# Patient Record
Sex: FEMALE | Race: WHITE | NOT HISPANIC OR LATINO | URBAN - METROPOLITAN AREA
[De-identification: names, ages, dates, MRNs, and addresses within clinical notes are randomized per-mention and may not be internally consistent; named-entity substitution may affect disease eponyms.]

---

## 2020-12-08 ENCOUNTER — NEW PATIENT (OUTPATIENT)
Dept: URBAN - METROPOLITAN AREA CLINIC 51 | Facility: CLINIC | Age: 70
End: 2020-12-08
Payer: MEDICARE

## 2020-12-08 DIAGNOSIS — H25.13 AGE-RELATED NUCLEAR CATARACT, BILATERAL: ICD-10-CM

## 2020-12-08 DIAGNOSIS — H43.813 VITREOUS DEGENERATION, BILATERAL: ICD-10-CM

## 2020-12-08 DIAGNOSIS — H16.143 PUNCTATE KERATITIS, BILATERAL: Primary | ICD-10-CM

## 2020-12-08 PROCEDURE — 92004 COMPRE OPH EXAM NEW PT 1/>: CPT | Performed by: OPTOMETRIST

## 2020-12-08 PROCEDURE — 92134 CPTRZ OPH DX IMG PST SGM RTA: CPT | Performed by: OPTOMETRIST

## 2020-12-08 RX ORDER — NEOMYCIN SULFATE, POLYMYXIN B SULFATE AND DEXAMETHASONE 3.5; 10000; 1 MG/ML; [USP'U]/ML; MG/ML
SUSPENSION OPHTHALMIC
Qty: 5 | Refills: 0 | Status: INACTIVE
Start: 2020-12-08 | End: 2021-12-28

## 2020-12-08 ASSESSMENT — INTRAOCULAR PRESSURE
OD: 17
OS: 19

## 2020-12-08 ASSESSMENT — KERATOMETRY
OS: 35.82
OD: 35.66

## 2020-12-08 ASSESSMENT — VISUAL ACUITY
OS: 20/25
OD: 20/25

## 2020-12-29 ENCOUNTER — FOLLOW UP ESTABLISHED (OUTPATIENT)
Dept: URBAN - METROPOLITAN AREA CLINIC 51 | Facility: CLINIC | Age: 70
End: 2020-12-29
Payer: MEDICARE

## 2020-12-29 DIAGNOSIS — H52.4 PRESBYOPIA: Primary | ICD-10-CM

## 2020-12-29 PROCEDURE — 92012 INTRM OPH EXAM EST PATIENT: CPT | Performed by: OPTOMETRIST

## 2020-12-29 RX ORDER — LIFITEGRAST 50 MG/ML
5 % SOLUTION/ DROPS OPHTHALMIC
Qty: 60 | Refills: 3 | Status: INACTIVE
Start: 2020-12-29 | End: 2021-12-28

## 2020-12-29 ASSESSMENT — KERATOMETRY
OS: 36.30
OD: 36.02

## 2020-12-29 ASSESSMENT — VISUAL ACUITY
OS: 20/20
OD: 20/20

## 2021-12-28 ENCOUNTER — OFFICE VISIT (OUTPATIENT)
Dept: URBAN - METROPOLITAN AREA CLINIC 51 | Facility: CLINIC | Age: 71
End: 2021-12-28
Payer: MEDICARE

## 2021-12-28 DIAGNOSIS — Z98.890 OTHER SPECIFIED POSTPROCEDURAL STATES: ICD-10-CM

## 2021-12-28 PROCEDURE — 99214 OFFICE O/P EST MOD 30 MIN: CPT | Performed by: OPTOMETRIST

## 2021-12-28 PROCEDURE — 92134 CPTRZ OPH DX IMG PST SGM RTA: CPT | Performed by: OPTOMETRIST

## 2021-12-28 ASSESSMENT — VISUAL ACUITY
OD: 20/25
OS: 20/25

## 2021-12-28 ASSESSMENT — KERATOMETRY
OD: 36.02
OS: 36.12

## 2021-12-28 ASSESSMENT — INTRAOCULAR PRESSURE
OD: 17
OS: 18

## 2021-12-28 NOTE — IMPRESSION/PLAN
Impression: Other specified postprocedural states Plan: RK OU Va fluctuations are normal, use ATs QID or more

## 2021-12-28 NOTE — IMPRESSION/PLAN
Impression: Age-related nuclear cataract, bilateral
Dr. Esperanza Leiva:  Discussed cataracts with patient. Discussed treatment options. Surgical treatment is recommended. Surgical risks and benefits discussed. Patient will consider surgical treatment.  
understands RK will limit VA

## 2021-12-28 NOTE — IMPRESSION/PLAN
Impression: Punctate keratitis, bilateral Plan: sjogrens AT QID or more
warm compresses
lid cleansers wash face , lids and brows
cool compress 
preservative free AT Do not rub eyes Pataday, zaditor or alaway otc
handout provided

## 2022-12-29 ENCOUNTER — OFFICE VISIT (OUTPATIENT)
Dept: URBAN - METROPOLITAN AREA CLINIC 51 | Facility: CLINIC | Age: 72
End: 2022-12-29
Payer: MEDICARE

## 2022-12-29 DIAGNOSIS — H52.4 PRESBYOPIA: ICD-10-CM

## 2022-12-29 DIAGNOSIS — Z98.890 OTHER SPECIFIED POSTPROCEDURAL STATES: Primary | ICD-10-CM

## 2022-12-29 DIAGNOSIS — H16.143 PUNCTATE KERATITIS, BILATERAL: ICD-10-CM

## 2022-12-29 DIAGNOSIS — H43.813 VITREOUS DEGENERATION, BILATERAL: ICD-10-CM

## 2022-12-29 DIAGNOSIS — H25.13 AGE-RELATED NUCLEAR CATARACT, BILATERAL: ICD-10-CM

## 2022-12-29 PROCEDURE — 92014 COMPRE OPH EXAM EST PT 1/>: CPT | Performed by: OPTOMETRIST

## 2022-12-29 PROCEDURE — 92134 CPTRZ OPH DX IMG PST SGM RTA: CPT | Performed by: OPTOMETRIST

## 2022-12-29 ASSESSMENT — KERATOMETRY
OD: 36.16
OS: 41.29

## 2022-12-29 ASSESSMENT — INTRAOCULAR PRESSURE
OD: 15
OS: 17

## 2022-12-29 ASSESSMENT — VISUAL ACUITY
OS: 20/40
OD: 20/30

## 2022-12-29 NOTE — IMPRESSION/PLAN
Impression: Punctate keratitis, bilateral Plan: sjogrens AT QID or more
warm compresses
lid cleansers wash face , lids and brows

## 2022-12-29 NOTE — IMPRESSION/PLAN
Impression: Age-related nuclear cataract, bilateral: H25.13.  Plan: hold off cat surgery till other health conditions are under control

## 2024-01-22 ENCOUNTER — OFFICE VISIT (OUTPATIENT)
Dept: URBAN - METROPOLITAN AREA CLINIC 51 | Facility: CLINIC | Age: 74
End: 2024-01-22
Payer: MEDICARE

## 2024-01-22 DIAGNOSIS — H43.813 VITREOUS DEGENERATION, BILATERAL: ICD-10-CM

## 2024-01-22 DIAGNOSIS — Z98.890 OTHER SPECIFIED POSTPROCEDURAL STATES: ICD-10-CM

## 2024-01-22 DIAGNOSIS — H25.13 AGE-RELATED NUCLEAR CATARACT, BILATERAL: Primary | ICD-10-CM

## 2024-01-22 DIAGNOSIS — H16.143 PUNCTATE KERATITIS, BILATERAL: ICD-10-CM

## 2024-01-22 PROCEDURE — 99214 OFFICE O/P EST MOD 30 MIN: CPT | Performed by: OPTOMETRIST

## 2024-01-22 PROCEDURE — 92134 CPTRZ OPH DX IMG PST SGM RTA: CPT | Performed by: OPTOMETRIST

## 2024-01-22 ASSESSMENT — INTRAOCULAR PRESSURE
OS: 17
OD: 16

## 2024-01-22 ASSESSMENT — KERATOMETRY
OS: 36.38
OD: 36.25

## 2024-01-22 ASSESSMENT — VISUAL ACUITY
OS: 20/40
OD: 20/25